# Patient Record
Sex: FEMALE
[De-identification: names, ages, dates, MRNs, and addresses within clinical notes are randomized per-mention and may not be internally consistent; named-entity substitution may affect disease eponyms.]

---

## 2022-05-12 ENCOUNTER — APPOINTMENT (OUTPATIENT)
Dept: ORTHOPEDIC SURGERY | Facility: CLINIC | Age: 62
End: 2022-05-12
Payer: COMMERCIAL

## 2022-05-12 VITALS — HEIGHT: 68 IN | BODY MASS INDEX: 21.98 KG/M2 | WEIGHT: 145 LBS

## 2022-05-12 DIAGNOSIS — Z78.9 OTHER SPECIFIED HEALTH STATUS: ICD-10-CM

## 2022-05-12 PROCEDURE — 73010 X-RAY EXAM OF SHOULDER BLADE: CPT | Mod: LT

## 2022-05-12 PROCEDURE — 20611 DRAIN/INJ JOINT/BURSA W/US: CPT | Mod: LT

## 2022-05-12 PROCEDURE — 99214 OFFICE O/P EST MOD 30 MIN: CPT | Mod: 25

## 2022-05-12 PROCEDURE — 73030 X-RAY EXAM OF SHOULDER: CPT | Mod: LT

## 2022-05-12 PROCEDURE — J3490M: CUSTOM

## 2022-05-12 PROCEDURE — 99204 OFFICE O/P NEW MOD 45 MIN: CPT | Mod: 25

## 2022-05-12 NOTE — HISTORY OF PRESENT ILLNESS
[de-identified] : 61 year old female  (RHD,  for [employer]) Left Shoulder pain ongoing for years worseing since 02/2021. Pain is located laterally and associated with stiffness and weakness. Pain is worse with activity and better rest. \par \par h/o Left Shoulder scope for LBR and "cleanup" in 2010\par \par 5/12/22 - had CSI in april 2021 and sent for PT

## 2022-05-12 NOTE — IMAGING
[de-identified] : \par LEFT SHOULDER\par Inspection: No swelling. \par Palpation: Tenderness is noted at the bicipital groove, anterior and lateral. \par Range of motion: There is pain with range of motion.\par , ER 55, @90ER 70, @90IR 30\par Strength: There is pain and discomfort with strength testing.\par Forward Flexion 4/5. Abduction 4/5.  External Rotation 5-/5 and Internal Rotation 5/5 \par Neurological testings: motor and sensor intact distally.\par Ligament Stability and Special Tests: \par There is positive arc of pain. \par Shoulder apprehension: neg\par Shoulder relocation: neg\par Lo’s test: pos\par Biceps Active test: neg\par Killian Labral Shear: neg\par Impingement testing: pos\par Susan testing: pos\par Whipple: pos\par Cross Body Adduction: neg\par \par

## 2022-05-12 NOTE — PROCEDURE
[FreeTextEntry3] : \par Injection Procedure Note:\par \par The risks, benefits, and alternatives to corticosteroid injection were reviewed with the patient.  Risks outlined include but are not limited to infection, sepsis, bleeding, scarring, skin discoloration, temporary increase in pain, syncopal episode, failure to resolve symptoms, symptoms recurrence, allergic reaction, flare reaction, and elevation of blood sugar in diabetics.  Patient understood the risks and asked to proceed with this treatment course.\par \par Patient Identification\par Name/: Verbal with patient and/or family\par \par Procedure Verification:\par Procedure confirmed with patient or family/designee\par Consent for procedure: Verbal Consent Given\par Relevant documentation completed, reviewed, and signed\par Clinical indications for procedure confirmed\par \par Time-out with all members of procedure team immediately prior to procedure:\par Correct patient identified. Agreement on procedure. Correct side and site.\par \par ULTRASOUND GUIDED SHOULDER SUBACROMIAL INJECTION - LEFT\par After verbal consent and identification of the correct patient and correct site, the posterior left shoulder was prepped using alcohol swabs and betadine. This was allowed time to air dry. After ethyl choride spray for skin anesthesia, a mixture of 1cc DepoMedrol 40mg/ml, 3cc Lidocaine 1%, and 3cc Bupivacaine 0.5% was injected under ultrasound guidance into the subacromial space from posterior using a sterile 22G needle. Visualization of the needle and placement of the injection was performed without any complications.  Ultrasound was used for visualization, precise injection in area of tear / calcific density, and / or prior failure or difficult injection.  The patient tolerated the procedure well. After-care instructions were provided and included instructions to ice the area and to call if redness, pain, or fever develop.\par

## 2022-05-12 NOTE — ASSESSMENT
[FreeTextEntry1] :  Left X-Ray Examination of the SHOULDER (2 views):  No fractures, subluxations or dislocations. GH narrowing and deg. ac deg\par X-Ray Examination of the SCAPULA 1 or 2 views shows: No significant abnormalities.  Acromial spur. \par \par - We discussed their diagnosis and treatment options at length. \par - We will continue conservative treatment with a course of PT, icing, and anti-inflammatory medication.\par - The patient was provided with a prescription to work on scapular strengthening and rotator cuff strengthening.\par - The patient was advised to let pain guide the gradual advancement of activities. \par - We also discussed the possible of a corticosteroid injection in order to help decrease inflammation and pain so that they can perform better therapy.\par - The risks, benefits, and alternatives to corticosteroid injection were reviewed with the patient and they wished to proceed with this treatment course. \par - Follow up as needed \par

## 2022-10-06 ENCOUNTER — APPOINTMENT (OUTPATIENT)
Dept: ORTHOPEDIC SURGERY | Facility: CLINIC | Age: 62
End: 2022-10-06

## 2022-10-06 VITALS — BODY MASS INDEX: 21.98 KG/M2 | WEIGHT: 145 LBS | HEIGHT: 68 IN

## 2022-10-06 PROCEDURE — J3490M: CUSTOM

## 2022-10-06 PROCEDURE — 99214 OFFICE O/P EST MOD 30 MIN: CPT | Mod: 25

## 2022-10-06 PROCEDURE — 20611 DRAIN/INJ JOINT/BURSA W/US: CPT

## 2022-10-06 NOTE — PROCEDURE
[FreeTextEntry3] : \par Injection Procedure Note:\par \par The risks, benefits, and alternatives to corticosteroid injection were reviewed with the patient.  Risks outlined include but are not limited to infection, sepsis, bleeding, scarring, skin discoloration, temporary increase in pain, syncopal episode, failure to resolve symptoms, symptoms recurrence, allergic reaction, flare reaction, and elevation of blood sugar in diabetics.  Patient understood the risks and asked to proceed with this treatment course.\par \par Patient Identification\par Name/: Verbal with patient and/or family\par \par Procedure Verification:\par Procedure confirmed with patient or family/designee\par Consent for procedure: Verbal Consent Given\par Relevant documentation completed, reviewed, and signed\par Clinical indications for procedure confirmed\par \par Time-out with all members of procedure team immediately prior to procedure:\par Correct patient identified. Agreement on procedure. Correct side and site.\par \par ULTRASOUND GUIDED SHOULDER GLENOHUMERAL INJECTION - LEFT\par After verbal consent and identification of the correct patient and correct site, the posterior left shoulder was prepped using alcohol swabs and betadine. This was allowed time to air dry. After ethyl choride spray for skin anesthesia, a mixture of 1cc DepoMedrol 40mg/ml, 3cc Lidocaine 1%, and 3cc Bupivacaine 0.5% was injected under ultrasound guidance into the glenohumeral joint from posterior using a sterile 22G needle. Visualization of the needle and placement of the injection was performed without any complications. The patient tolerated the procedure well. After-care instructions were provided and included instructions to ice the area and to call if redness, pain, or fever develop.\par

## 2022-10-06 NOTE — IMAGING
[de-identified] : \par LEFT SHOULDER\par Inspection: No swelling. \par Palpation: Tenderness is noted at the bicipital groove, anterior and lateral. \par Range of motion: There is pain with range of motion.\par , ER 50, @90ER 70, @90IR 30\par Strength: There is pain and discomfort with strength testing.\par Forward Flexion 4/5. Abduction 4/5.  External Rotation 5-/5 and Internal Rotation 5/5 \par Neurological testings: motor and sensor intact distally.\par Ligament Stability and Special Tests: \par There is positive arc of pain. \par Shoulder apprehension: neg\par Shoulder relocation: neg\par Lo’s test: pos\par Biceps Active test: neg\par Killian Labral Shear: neg\par Impingement testing: pos\par Susan testing: pos\par Whipple: pos\par Cross Body Adduction: neg\par \par

## 2022-10-06 NOTE — ASSESSMENT
[FreeTextEntry1] : left GH arthritis and imp exab\par \par \par - The patient was advised of the diagnosis.  The natural history of the pathology was explained to the patient in layman's terms.  Several different treatment options were discussed and explained including the risks and benefits of both surgical and non-surgical treatments.  All questions and concerns were answered. \par - We will continue conservative treatment with a course of PT, icing, and anti-inflammatory medication.\par - The patient was provided with a prescription to work on scapular strengthening and rotator cuff strengthening.\par - The patient was advised to let pain guide the gradual advancement of activities. \par - We also discussed the possible of a corticosteroid injection in order to help decrease inflammation and pain so that they can perform better therapy.\par - The risks, benefits, and alternatives to corticosteroid injection were reviewed with the patient and they wished to proceed with this treatment course. \par - Follow up as needed , may eventually need shoulder arthoplasty\par

## 2022-10-06 NOTE — HISTORY OF PRESENT ILLNESS
[de-identified] : 61 year old female  (RHD,  for [employer]) Left Shoulder pain ongoing for years worseing since 02/2021. Pain is located laterally and associated with stiffness and weakness. Pain is worse with activity and better rest. \par \par h/o Left Shoulder scope for LBR and "cleanup" in 2010\par \par 5/12/22 - had CSI in april 2021 and sent for PT\par 10/6/22 - sent for PT, doing HEP, icing, cont to have pain and stiffness worse with reaching and OH activitiy

## 2023-02-09 ENCOUNTER — APPOINTMENT (OUTPATIENT)
Dept: ORTHOPEDIC SURGERY | Facility: CLINIC | Age: 63
End: 2023-02-09
Payer: COMMERCIAL

## 2023-02-09 VITALS — BODY MASS INDEX: 21.98 KG/M2 | HEIGHT: 68 IN | WEIGHT: 145 LBS

## 2023-02-09 PROCEDURE — 99214 OFFICE O/P EST MOD 30 MIN: CPT | Mod: 25

## 2023-02-09 PROCEDURE — J3490M: CUSTOM

## 2023-02-09 PROCEDURE — 20611 DRAIN/INJ JOINT/BURSA W/US: CPT | Mod: LT

## 2023-02-09 NOTE — IMAGING
[de-identified] : \par LEFT SHOULDER\par Inspection: No swelling. \par Palpation: Tenderness is noted at the bicipital groove, anterior and lateral. \par Range of motion: There is pain with range of motion.\par , ER 50, @90ER 70, @90IR 30\par Strength: There is pain and discomfort with strength testing.\par Forward Flexion 4/5. Abduction 4/5.  External Rotation 5-/5 and Internal Rotation 5/5 \par Neurological testings: motor and sensor intact distally.\par Ligament Stability and Special Tests: \par There is positive arc of pain. \par Shoulder apprehension: neg\par Shoulder relocation: neg\par Lo’s test: pos\par Biceps Active test: neg\par Killian Labral Shear: neg\par Impingement testing: pos\par Susan testing: pos\par Whipple: pos\par Cross Body Adduction: neg\par \par

## 2023-02-09 NOTE — ASSESSMENT
[FreeTextEntry1] : left GH arthritis and imp exab\par \par \par - The patient was advised of the diagnosis.  The natural history of the pathology was explained to the patient in layman's terms.  Several different treatment options were discussed and explained including the risks and benefits of both surgical and non-surgical treatments.  All questions and concerns were answered. \par - We will continue conservative treatment with a course of PT, icing, and anti-inflammatory medication.\par - The patient was provided with a prescription to work on scapular strengthening and rotator cuff strengthening.\par - The patient was advised to let pain guide the gradual advancement of activities. \par - We also discussed the possible of a corticosteroid injection in order to help decrease inflammation and pain so that they can perform better therapy.\par - The risks, benefits, and alternatives to corticosteroid injection were reviewed with the patient and they wished to proceed with this treatment course. \par - Follow up as needed , may eventually need shoulder arthoplasty discussed the surgery at length and she wants to meet with Peña to discuss\par

## 2023-02-09 NOTE — HISTORY OF PRESENT ILLNESS
[de-identified] : 62 year old female  (RHD,  for [employer]) Left Shoulder pain ongoing for years worseing since 02/2021. Pain is located laterally and associated with stiffness and weakness. Pain is worse with activity and better rest. \par \par h/o Left Shoulder scope for LBR and "cleanup" in 2010\par \par 5/12/22 - had CSI in april 2021 and sent for PT\par 10/6/22 - sent for PT, doing HEP, icing, cont to have pain and stiffness worse with reaching and OH activity\par 2/9/23- had CSI (10/6) with some relief went to [facility] but pain again

## 2023-04-05 ENCOUNTER — APPOINTMENT (OUTPATIENT)
Dept: ORTHOPEDIC SURGERY | Facility: CLINIC | Age: 63
End: 2023-04-05
Payer: COMMERCIAL

## 2023-04-05 DIAGNOSIS — M25.519 PAIN IN UNSPECIFIED SHOULDER: ICD-10-CM

## 2023-04-05 DIAGNOSIS — M75.51 BURSITIS OF RIGHT SHOULDER: ICD-10-CM

## 2023-04-05 PROCEDURE — 76882 US LMTD JT/FCL EVL NVASC XTR: CPT | Mod: 59

## 2023-04-05 PROCEDURE — 20611 DRAIN/INJ JOINT/BURSA W/US: CPT

## 2023-04-05 PROCEDURE — 73010 X-RAY EXAM OF SHOULDER BLADE: CPT | Mod: RT

## 2023-04-05 PROCEDURE — 73030 X-RAY EXAM OF SHOULDER: CPT | Mod: RT

## 2023-04-05 PROCEDURE — 99213 OFFICE O/P EST LOW 20 MIN: CPT | Mod: 25

## 2023-04-05 NOTE — DISCUSSION/SUMMARY
[de-identified] : 62f with right shoulder bursitis and djd\par 1) csi right shoulder today - tolerated well\par 2) cryotherapy, rest and activity modification\par 3) glucosamine / chondroitin \par 4) home exercises\par 5) rtc prn\par \par Entered by Mariposa Díaz acting as scribe.\par Dr. Harper-\par The documentation recorded by the scribe accurately reflects the service I personally performed and the decisions made by me. \par \par

## 2023-04-05 NOTE — IMAGING
[Right] : right shoulder [Degenerative change] : Degenerative change [There are no fractures, subluxations or dislocations. No significant abnormalities are seen] : There are no fractures, subluxations or dislocations. No significant abnormalities are seen

## 2023-04-05 NOTE — PHYSICAL EXAM
[Left] : left shoulder [] : negative Speed's [de-identified] : -gina [TWNoteComboBox7] : active forward flexion 170 degrees [TWNoteComboBox6] : internal rotation L1 [de-identified] : active abduction 170 degrees [de-identified] : external rotation at 90 degrees of abduction 80 degrees [TWNoteComboBox5] : internal rotation at 90 degrees of abduction 15 degrees

## 2023-04-05 NOTE — PROCEDURE
[FreeTextEntry3] :  Large joint injection was performed of the right  shoulder. An injection of Lidocaine 3cc of 1% , Ropivacaine 4cc of 0.5% , Triamcinolone (Kenalog) 2cc of 40 mg was used. Patient was advised to call if redness, pain or fever occur and apply ice for 15 minutes out of every hour for the next 12-24 hours as tolerated.\par \par Patient has tried OTC's including aspirin, Ibuprofen, Aleve, etc or prescription NSAIDS, and/or exercises at home and/or physical therapy without satisfactory response, patient had decreased mobility in the joint and the risks benefits, and alternatives have been discussed, and verbal consent was obtained.\par \par The site was prepped with alcohol, betadine and ethyl chloride sprayed topically\par \par The risks, benefits and contents of the injection have been discussed. Risks include but are not limited to allergic reaction, flare reaction, permanent white skin discoloration at the injection site and infection. The patient understands the risks and agrees to having the injection. All questions have been answered.\par \par Ultrasound guidance was indicated for this patient due to prior failure or difficult injection. All ultrasound images have been permanently captured and stored accordingly in our picture archiving and communication system. Visualization of the needle and placement of injection was performed without complication.\par An ultrasound of the extremity was indicated due to evaluate tendon for tears, tendonitis, soft tissue mass, or ganglion cyst. The findings showed no evidence of ligament, tendon or muscle tear.\par

## 2023-07-27 ENCOUNTER — APPOINTMENT (OUTPATIENT)
Dept: ORTHOPEDIC SURGERY | Facility: CLINIC | Age: 63
End: 2023-07-27
Payer: COMMERCIAL

## 2023-07-27 VITALS — WEIGHT: 147 LBS | BODY MASS INDEX: 22.28 KG/M2 | HEIGHT: 68 IN

## 2023-07-27 DIAGNOSIS — M19.012 PRIMARY OSTEOARTHRITIS, LEFT SHOULDER: ICD-10-CM

## 2023-07-27 DIAGNOSIS — M75.42 IMPINGEMENT SYNDROME OF LEFT SHOULDER: ICD-10-CM

## 2023-07-27 PROCEDURE — 99214 OFFICE O/P EST MOD 30 MIN: CPT | Mod: 25

## 2023-07-27 PROCEDURE — 20611 DRAIN/INJ JOINT/BURSA W/US: CPT | Mod: LT

## 2023-07-27 PROCEDURE — J3490M: CUSTOM

## 2023-07-27 NOTE — ASSESSMENT
[FreeTextEntry1] : left GH arthritis and imp exab\par \par \par - The patient was advised of the diagnosis.  The natural history of the pathology was explained to the patient in layman's terms.  Several different treatment options were discussed and explained including the risks and benefits of both surgical and non-surgical treatments.  All questions and concerns were answered. \par - We will continue conservative treatment with a course of PT, icing, and anti-inflammatory medication.\par - The patient was provided with a prescription to work on scapular strengthening and rotator cuff strengthening.\par - The patient was advised to let pain guide the gradual advancement of activities. \par - We also discussed the possible of a corticosteroid injection in order to help decrease inflammation and pain so that they can perform better therapy.\par - The risks, benefits, and alternatives to corticosteroid injection were reviewed with the patient and they wished to proceed with this treatment course. \par - Follow up as needed , may eventually need shoulder arthoplasty discussed the surgery at length and she wants to meet with Peña to discuss\par \par (may eventually need TSA)

## 2023-07-27 NOTE — HISTORY OF PRESENT ILLNESS
[de-identified] : 62 year old female  (RHD,  for [employer]) Left Shoulder pain ongoing for years worsening since 02/2021. Pain is located laterally and associated with stiffness and weakness. Pain is worse with activity and better rest. \par \par h/o Left Shoulder scope for LBR and "cleanup" in 2010\par \par 5/12/22 - had CSI in April 2021 and sent for PT\par 10/6/22 - sent for PT, doing HEP, icing, cont to have pain and stiffness worse with reaching and OH activity\par 2/9/23- had CSI (10/6) with some relief went to PT [facility] but pain again\par \par 7/27/23- had CSI ( 2/9), doing PT and HEP, intefers with sleep

## 2023-07-27 NOTE — PROCEDURE
[FreeTextEntry3] : \par Injection Procedure Note:\par \par The risks, benefits, and alternatives to corticosteroid injection were reviewed with the patient.  Risks outlined include but are not limited to infection, sepsis, bleeding, scarring, skin discoloration, temporary increase in pain, syncopal episode, failure to resolve symptoms, symptoms recurrence, allergic reaction, flare reaction, and elevation of blood sugar in diabetics.  Patient understood the risks and asked to proceed with this treatment course.\par \par Patient Identification\par Name/: Verbal with patient and/or family\par \par Procedure Verification:\par Procedure confirmed with patient or family/designee\par Consent for procedure: Verbal Consent Given\par Relevant documentation completed, reviewed, and signed\par Clinical indications for procedure confirmed\par \par Time-out with all members of procedure team immediately prior to procedure:\par Correct patient identified. Agreement on procedure. Correct side and site.\par \par ULTRASOUND GUIDED SHOULDER GLENOHUMERAL INJECTION - LEFT\par After verbal consent and identification of the correct patient and correct site, the posterior left shoulder was prepped using alcohol swabs and betadine. This was allowed time to air dry. After ethyl choride spray for skin anesthesia, a mixture of 1cc DepoMedrol 40mg/ml, 3cc Lidocaine 1%, and 3cc Bupivacaine 0.5% was injected under ultrasound guidance into the glenohumeral joint from posterior using a sterile 22G needle. Visualization of the needle and placement of the injection was performed without any complications. The patient tolerated the procedure well. After-care instructions were provided and included instructions to ice the area and to call if redness, pain, or fever develop.

## 2023-07-27 NOTE — IMAGING
[de-identified] : \par LEFT SHOULDER\par Inspection: No swelling. \par Palpation: Tenderness is noted at the bicipital groove, anterior and lateral. \par Range of motion: There is pain with range of motion.\par , ER 50, @90ER 70, @90IR 30\par Strength: There is pain and discomfort with strength testing.\par Forward Flexion 4/5. Abduction 4/5.  External Rotation 5-/5 and Internal Rotation 5/5 \par Neurological testings: motor and sensor intact distally.\par Ligament Stability and Special Tests: \par There is positive arc of pain. \par Shoulder apprehension: neg\par Shoulder relocation: neg\par Lo’s test: pos\par Biceps Active test: neg\par Killian Labral Shear: neg\par Impingement testing: pos\par Susan testing: pos\par Whipple: pos\par Cross Body Adduction: neg\par \par

## 2023-11-01 ENCOUNTER — APPOINTMENT (OUTPATIENT)
Dept: ORTHOPEDIC SURGERY | Facility: CLINIC | Age: 63
End: 2023-11-01
Payer: COMMERCIAL

## 2023-11-01 VITALS — HEIGHT: 68 IN | WEIGHT: 147 LBS | BODY MASS INDEX: 22.28 KG/M2

## 2023-11-01 DIAGNOSIS — M19.011 PRIMARY OSTEOARTHRITIS, RIGHT SHOULDER: ICD-10-CM

## 2023-11-01 PROCEDURE — J3490M: CUSTOM

## 2023-11-01 PROCEDURE — 99213 OFFICE O/P EST LOW 20 MIN: CPT | Mod: 25

## 2023-11-01 PROCEDURE — 20611 DRAIN/INJ JOINT/BURSA W/US: CPT | Mod: RT
